# Patient Record
Sex: FEMALE | Race: BLACK OR AFRICAN AMERICAN | NOT HISPANIC OR LATINO | URBAN - METROPOLITAN AREA
[De-identification: names, ages, dates, MRNs, and addresses within clinical notes are randomized per-mention and may not be internally consistent; named-entity substitution may affect disease eponyms.]

---

## 2020-10-06 ENCOUNTER — EMERGENCY (EMERGENCY)
Facility: HOSPITAL | Age: 60
LOS: 1 days | Discharge: HOME | End: 2020-10-06
Attending: EMERGENCY MEDICINE
Payer: COMMERCIAL

## 2020-10-06 VITALS
SYSTOLIC BLOOD PRESSURE: 136 MMHG | TEMPERATURE: 98 F | RESPIRATION RATE: 20 BRPM | WEIGHT: 180.78 LBS | DIASTOLIC BLOOD PRESSURE: 89 MMHG | HEIGHT: 67 IN | HEART RATE: 100 BPM | OXYGEN SATURATION: 98 %

## 2020-10-06 PROCEDURE — 99283 EMERGENCY DEPT VISIT LOW MDM: CPT

## 2020-10-06 RX ORDER — DIAZEPAM 5 MG
5 TABLET ORAL ONCE
Refills: 0 | Status: DISCONTINUED | OUTPATIENT
Start: 2020-10-06 | End: 2020-10-06

## 2020-10-06 RX ORDER — IBUPROFEN 200 MG
600 TABLET ORAL ONCE
Refills: 0 | Status: DISCONTINUED | OUTPATIENT
Start: 2020-10-06 | End: 2020-10-06

## 2020-10-06 RX ORDER — ACETAMINOPHEN 500 MG
2 TABLET ORAL
Qty: 30 | Refills: 0
Start: 2020-10-06

## 2020-10-06 RX ORDER — ACETAMINOPHEN 500 MG
650 TABLET ORAL ONCE
Refills: 0 | Status: COMPLETED | OUTPATIENT
Start: 2020-10-06 | End: 2020-10-06

## 2020-10-06 RX ORDER — DIAZEPAM 5 MG
1 TABLET ORAL
Qty: 5 | Refills: 0
Start: 2020-10-06 | End: 2020-10-10

## 2020-10-06 RX ORDER — DIAZEPAM 5 MG
1 TABLET ORAL
Qty: 5 | Refills: 0
Start: 2020-10-06 | End: 2020-10-11

## 2020-10-06 RX ADMIN — Medication 650 MILLIGRAM(S): at 18:26

## 2020-10-06 RX ADMIN — Medication 5 MILLIGRAM(S): at 18:26

## 2020-10-06 NOTE — ED PROVIDER NOTE - OBJECTIVE STATEMENT
61 yo F denies pmh presents with back pain x 2 days. Yesterday at work pt bumped head against wall. Noticed neck and back pain today upon waking. Feels stiff and sore. Denies other acute complaints.

## 2020-10-06 NOTE — ED PROVIDER NOTE - NS ED ROS FT
CONSTITUTIONAL: no fever, no chills   EYES: no visual changes, no eye pain   ENMT: no nasal congestion, no throat pain  CARDIOVASCULAR: no chest pain, no edema, no palpitations   RESPIRATORY: no shortness of breath, no cough   GASTROINTESTINAL: no abdominal pain, no nausea, no vomiting, no diarrhea, no constipation   GENITOURINARY: no dysuria, no frequency  MUSCULOSKELETAL: no joint pains, no myalgias, +back pain, +neck pain   SKIN: no rashes  NEUROLOGICAL: no weakness, no headache, no dizziness, no slurred speech, no syncope   PSYCHIATRIC: no known mental health illness   HEME/LYMPH: no lymphadenopathy      All other ROS negative except as per HPI

## 2020-10-06 NOTE — ED ADULT NURSE NOTE - OBJECTIVE STATEMENT
Pt. c/o neck pain after hitting head on the wall when bending under tape that blocked the bathroom to the public.

## 2020-10-06 NOTE — ED PROVIDER NOTE - PHYSICAL EXAMINATION
GENERAL: well appearing, no acute distress   HEAD: atraumatic   EYES: EOMI, pink conjunctiva   ENT: moist oral mucosa   CARDIAC: RRR, no edema, distal pulses present   RESPIRATORY: lungs CTAB, no increased work of breathing   GASTROINTESTINAL: no abdominal tenderness, no rebound or guarding, bowel sounds presents  GENITOURINARY: no CVA tenderness   MUSCULOSKELETAL: no deformity; +cervical and thoracic paraspinal ttp; no midline ttp   NEUROLOGICAL: AAOx3, CN's II-XII intact, strength 5/5 bilateral UE and LE, sensation intact to light touch, finger to nose intact, steady gait  SKIN: intact   PSYCHIATRIC: cooperative  HEME LYMPH: no lymphadenopathy

## 2020-10-06 NOTE — ED PROVIDER NOTE - CLINICAL SUMMARY MEDICAL DECISION MAKING FREE TEXT BOX
61 yo F with muscles strain s/p minor trauma yesterday. Normal neuro exam. No concerning features or red flags. Will treat symptoms and dc with PCP fu. Discussed indications for patient return to ED. Patient understood.

## 2020-10-06 NOTE — ED PROVIDER NOTE - NSFOLLOWUPINSTRUCTIONS_ED_ALL_ED_FT
Log Out.      [employer] CareNotes®     :  [facility]  	                       THORACIC BACK STRAIN - AfterCare(R) Instructions(ER/ED)           Thoracic Back Strain    WHAT YOU NEED TO KNOW:    A thoracic back strain is a muscle or tendon injury in your upper or middle back. You may have pain, muscle spasms, swelling, or stiffness. A mild strain may cause minor pain that goes away in a few days. A more severe strain may cause the muscle or tendon to tear. There is a very small chance you may need surgery to fix the tear.    DISCHARGE INSTRUCTIONS:    Call your local emergency number (911 in the ) for any of the following:   •You have chest pain or shortness of breath.          Return to the emergency department if:   •You have severe pain, or pain that spreads from your back to other areas.      •You have new or increased swelling or redness in the injured area.      Call your doctor if:   •You have questions or concerns about your condition or care.          Medicines: You may need any of the following:   •Prescription pain medicine may be given. Ask your healthcare provider how to take this medicine safely. Some prescription pain medicines contain acetaminophen. Do not take other medicines that contain acetaminophen without talking to your healthcare provider. Too much acetaminophen may cause liver damage. Prescription pain medicine may cause constipation. Ask your healthcare provider how to prevent or treat constipation.       •NSAIDs, such as ibuprofen, help decrease swelling, pain, and fever. This medicine is available with or without a doctor's order. NSAIDs can cause stomach bleeding or kidney problems in certain people. If you take blood thinner medicine, always ask your healthcare provider if NSAIDs are safe for you. Always read the medicine label and follow directions.      •Muscle relaxers help prevent or treat spasms.      •Take your medicine as directed. Contact your healthcare provider if you think your medicine is not helping or if you have side effects. Tell him or her if you are allergic to any medicine. Keep a list of the medicines, vitamins, and herbs you take. Include the amounts, and when and why you take them. Bring the list or the pill bottles to follow-up visits. Carry your medicine list with you in case of an emergency.      Self-care:   •Rest as directed. Move slowly and carefully. Do not lift heavy objects.      •Apply ice or heat as directed. Ice decreases pain and swelling and may help decrease tissue damage. Heat helps decrease muscle spasms. Your healthcare provider may tell you to apply only ice for the first 24 hours to help reduce swelling. Apply ice or heat to the area for 20 minutes every hour, or as directed. Ask how many times to do this each day, and for how many days.      •Use an elastic wrap or back brace as directed. These will help keep the injured area from moving so it can heal.      •Go to physical therapy as directed. A physical therapist can teach you exercises to help strengthen your back. He or she can also teach you safe ways to bend and move so you do not cause more injury.      Prevent another thoracic back strain:   •Lift objects carefully. Ask someone to help you lift heavy objects. If you must lift an object by yourself, do not use your back muscles to lift. Lift with your legs.      •Check your posture. Keep your upper body lifted and your head up. Poor posture can cause back strain or make it worse. Adjust your position if you work in front of a computer. You may need arm or wrist supports or change the height of your chair.      •Exercise as directed. Exercise can help strengthen your muscles and make you more flexible. Do not exercise or play sports when you are tired. Always warm up before you start and cool down when you finish.      •Stretch your muscles as directed. Keep your muscles limber by stretching every day. Stretch before you exercise.      Follow up with your doctor as directed: You may need more tests to check for other injuries or to see how your injury is healing. You may also need to see a specialist. Write down your questions so you remember to ask them during your visits.       © Copyright [employer] 2020           back to top                          © Copyright [employer] 2020

## 2020-10-06 NOTE — ED ADULT NURSE NOTE - CHIEF COMPLAINT QUOTE
Pain to back of neck, shoulders, upper back after bending under a taped area and hit self against a wall x yesterday.

## 2020-10-06 NOTE — ED PROVIDER NOTE - PATIENT PORTAL LINK FT
You can access the FollowMyHealth Patient Portal offered by [facility] by registering at the following website: [email or web address]. By joining [employer]’s FollowMyHealth portal, you will also be able to view your health information using other applications (apps) compatible with our system.

## 2021-02-16 NOTE — ED ADULT TRIAGE NOTE - CHIEF COMPLAINT QUOTE
Pain to back of neck, shoulders, upper back after bending under a taped area and hit self against a wall x yesterday.
same name as above